# Patient Record
Sex: MALE | Race: WHITE | HISPANIC OR LATINO | Employment: FULL TIME | URBAN - METROPOLITAN AREA
[De-identification: names, ages, dates, MRNs, and addresses within clinical notes are randomized per-mention and may not be internally consistent; named-entity substitution may affect disease eponyms.]

---

## 2023-10-19 ENCOUNTER — APPOINTMENT (OUTPATIENT)
Dept: CT IMAGING | Facility: HOSPITAL | Age: 38
End: 2023-10-19
Payer: MEDICAID

## 2023-10-19 ENCOUNTER — HOSPITAL ENCOUNTER (EMERGENCY)
Facility: HOSPITAL | Age: 38
Discharge: HOME | End: 2023-10-19
Attending: STUDENT IN AN ORGANIZED HEALTH CARE EDUCATION/TRAINING PROGRAM
Payer: MEDICAID

## 2023-10-19 VITALS
OXYGEN SATURATION: 97 % | HEIGHT: 67 IN | WEIGHT: 143.3 LBS | DIASTOLIC BLOOD PRESSURE: 69 MMHG | BODY MASS INDEX: 22.49 KG/M2 | SYSTOLIC BLOOD PRESSURE: 113 MMHG | RESPIRATION RATE: 16 BRPM | HEART RATE: 68 BPM | TEMPERATURE: 96.8 F

## 2023-10-19 DIAGNOSIS — M54.32 SCIATICA OF LEFT SIDE: Primary | ICD-10-CM

## 2023-10-19 PROCEDURE — 25010000002 KETOROLAC TROMETHAMINE PER 15 MG: Performed by: STUDENT IN AN ORGANIZED HEALTH CARE EDUCATION/TRAINING PROGRAM

## 2023-10-19 PROCEDURE — 99284 EMERGENCY DEPT VISIT MOD MDM: CPT

## 2023-10-19 PROCEDURE — 72131 CT LUMBAR SPINE W/O DYE: CPT

## 2023-10-19 PROCEDURE — 96372 THER/PROPH/DIAG INJ SC/IM: CPT

## 2023-10-19 RX ORDER — NAPROXEN 500 MG/1
500 TABLET ORAL 2 TIMES DAILY PRN
Qty: 30 TABLET | Refills: 0 | Status: SHIPPED | OUTPATIENT
Start: 2023-10-19

## 2023-10-19 RX ORDER — SENNOSIDES 8.6 MG
650 CAPSULE ORAL EVERY 8 HOURS PRN
COMMUNITY

## 2023-10-19 RX ORDER — LIDOCAINE 50 MG/G
1 PATCH TOPICAL
Status: DISCONTINUED | OUTPATIENT
Start: 2023-10-19 | End: 2023-10-19 | Stop reason: HOSPADM

## 2023-10-19 RX ORDER — KETOROLAC TROMETHAMINE 30 MG/ML
30 INJECTION, SOLUTION INTRAMUSCULAR; INTRAVENOUS ONCE
Status: COMPLETED | OUTPATIENT
Start: 2023-10-19 | End: 2023-10-19

## 2023-10-19 RX ORDER — LIDOCAINE 50 MG/G
1 PATCH TOPICAL EVERY 24 HOURS
Qty: 20 PATCH | Refills: 0 | Status: SHIPPED | OUTPATIENT
Start: 2023-10-19

## 2023-10-19 RX ADMIN — LIDOCAINE 1 PATCH: 700 PATCH TOPICAL at 14:31

## 2023-10-19 RX ADMIN — KETOROLAC TROMETHAMINE 30 MG: 30 INJECTION, SOLUTION INTRAMUSCULAR; INTRAVENOUS at 14:31

## 2023-10-19 NOTE — ED PROVIDER NOTES
EMERGENCY DEPARTMENT ENCOUNTER    Room Number:  20/20  PCP: Provider, No Known  Historian: Patient, wife      HPI:  Chief Complaint: Back pain    Context: Zeus Herrera is a 38 y.o. male who presents to the ED c/o back pain patient has pain in his left lower and midline lumbar back.  Symptoms been occurring for approximately a year and are intermittent in nature.  Patient states sometimes he has pain that radiates down his left leg.  Patient states he immigrated to [place] from [place] approximately 1 year ago and [place].  Patient states he thinks this is when the pain started.  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            PAST MEDICAL HISTORY  Active Ambulatory Problems     Diagnosis Date Noted    No Active Ambulatory Problems     Resolved Ambulatory Problems     Diagnosis Date Noted    No Resolved Ambulatory Problems     No Additional Past Medical History         PAST SURGICAL HISTORY  Past Surgical History:   Procedure Laterality Date    APPENDECTOMY           FAMILY HISTORY  Family History   Problem Relation Age of Onset    Diabetes Mother     Asthma Mother     Schizophrenia Father     Diabetes Maternal Grandmother          SOCIAL HISTORY  Social History     Socioeconomic History    Marital status:    Tobacco Use    Smoking status: Every Day     Packs/day: 1.00     Years: 23.00     Additional pack years: 0.00     Total pack years: 23.00     Types: Cigarettes   Vaping Use    Vaping Use: Every day    Substances: Nicotine   Substance and Sexual Activity    Alcohol use: Yes     Alcohol/week: 24.0 standard drinks of alcohol     Types: 24 Cans of beer per week    Drug use: Never    Sexual activity: Yes         ALLERGIES  Patient has no known allergies.        REVIEW OF SYSTEMS  Review of Systems   Constitutional:  Negative for chills and fever.   Gastrointestinal:  Negative for abdominal pain,  nausea and vomiting.   Genitourinary:  Negative for dysuria.   Musculoskeletal:  Positive for back pain.            PHYSICAL EXAM  ED Triage Vitals   Temp Heart Rate Resp BP SpO2   10/19/23 1156 10/19/23 1156 10/19/23 1156 10/19/23 1203 10/19/23 1156   96.8 °F (36 °C) 80 16 138/86 100 %      Temp src Heart Rate Source Patient Position BP Location FiO2 (%)   10/19/23 1156 10/19/23 1156 -- -- --   Tympanic Monitor          Physical Exam      GENERAL: no acute distress  HENT: nares patent  EYES: no scleral icterus  CV: regular rhythm, normal rate  RESPIRATORY: normal effort  ABDOMEN: soft  MUSCULOSKELETAL: no deformity.  Tenderness to palpation in the mid lumbar region and left lower lumbar  NEURO: alert, moves all extremities, follows commands  PSYCH:  calm, cooperative  SKIN: warm, dry    Vital signs and nursing notes reviewed.          LAB RESULTS  No results found for this or any previous visit (from the past 24 hour(s)).    Ordered the above labs and reviewed the results.        RADIOLOGY  CT Lumbar Spine Without Contrast    Result Date: 10/19/2023  CT LUMBAR SPINE WITHOUT CONTRAST  HISTORY: Back pain, left radiculopathy.  COMPARISON: None.  FINDINGS: There is a grade 1 retrolisthesis of L4 upon L5 estimated to be 2 mm. The bladder is distended, only partially visualized. Clinical correlation is recommended.  L1-L2: There is evidence of disc bulge or herniation.  L2-L3: There is no evidence of disc bulge or herniation.  L3-L4: Mild central disc bulge with no evidence of disc herniation.  L4-L5: A mild central disc bulge is present with no evidence of herniation.  L5-S1: Transitional anatomy is appreciated. There is partial sacralization of L5.      1.  Transitional anatomy is appreciated consisting of partial sacralization of L5. Careful correlation prior to any intervention is required given the presence of transitional anatomy. 2.  Mild degenerative disease is noted as described above. There is no evidence of a  focal disc herniation. 3.  Note is made of a distended bladder, only partially visualized.: Correlation is recommended. 4.  Further evaluation could be performed with a MRI examination of the lumbar spine as indicated.     Radiation dose reduction techniques were utilized, including automated exposure control and exposure modulation based on body size.    This report was finalized on 10/19/2023 2:29 PM by Dr. Munir Ramirez M.D on Workstation: [employer]       Ordered the above noted radiological studies. Reviewed by me in PACS.              MEDICATIONS GIVEN IN ER  Medications   lidocaine (LIDODERM) 5 % 1 patch (1 patch Transdermal Medication Applied 10/19/23 1431)   ketorolac (TORADOL) injection 30 mg (30 mg Intramuscular Given 10/19/23 1431)                   MEDICAL DECISION MAKING, PROGRESS, and CONSULTS    All labs have been independently reviewed by me.  All radiology studies have been reviewed by me and I have also reviewed the radiology report.   EKG's independently viewed and interpreted by me.  Discussion below represents my analysis of pertinent findings related to patient's condition, differential diagnosis, treatment plan and final disposition.      Additional sources:  - Discussed/ obtained information from independent historians: Wife at bedside    - Chronic or social conditions impacting care: Non-English-speaking, recent immigrant    - Shared decision making: Utilizing shared decision-making techniques we discussed today's findings and plan moving forward.  At this time we elected to treat conservatively and follow-up with primary care      Orders placed during this visit:  Orders Placed This Encounter   Procedures    CT Lumbar Spine Without Contrast       Differential diagnosis includes but is not limited to:    Muscle strain, muscle spasm, lumbar fracture      Independent interpretation of labs, radiology studies, and discussions with consultants:  ED Course as of 10/19/23 1502   Thu Oct 19, 2023    1501 CT lumbar reviewed and demonstrates no evidence of fracture or malalignment [MW]   1501 Patient treated symptomatically with Toradol and Lidoderm patch.  Believe etiology of symptoms are musculoskeletal in nature with associated sciatica. [MW]   1501 Patient educated on supportive care measures and prescriptions for naproxen and Lidoderm patch sent to patient's preferred pharmacy.  Patient counseled to follow-up with primary care and provided physician finder phone number to establish care if needed. [MW]   1502 Patient discharged in stable condition. [MW]      ED Course User Index  [MW] Matthieu Mcdonald MD             DIAGNOSIS  Final diagnoses:   Sciatica of left side         DISPOSITION  DISCHARGE    Patient discharged in stable condition.    Reviewed implications of results, diagnosis, meds, responsibility to follow up, warning signs and symptoms of possible worsening, potential complications and reasons to return to ER, including uncontrolled back pain, fevers, chills, difficulty controlling urine, groin numbness, leg weakness.    Patient/Family voiced understanding of above instructions.    Discussed plan for discharge, as there is no emergent indication for admission. Patient referred to primary care provider for BP management due to today's BP. Pt/family is agreeable and understands need for follow up and repeat testing.  Pt is aware that discharge does not mean that nothing is wrong but it indicates no emergency is present that requires admission and they must continue care with follow-up as given below or physician of their choice.     FOLLOW-UP  Provider, No Known  [facility] [id number] [phone]    In 1 week      [facility] PROVIDER FINDER  [address]  [facility] [address] [phone]  In 1 week           Medication List        New Prescriptions      lidocaine 5 %  Commonly known as: LIDODERM  Place 1 patch on the skin as directed by provider  Daily. Remove & Discard patch within 12 hours or as directed by MD     naproxen 500 MG EC tablet  Commonly known as: EC NAPROSYN  Take 1 tablet by mouth 2 (Two) Times a Day As Needed for Mild Pain.               Where to Get Your Medications        These medications were sent to [id number] - [place], [place] - [address] AT [facility] - [phone]  - [phone]   [address], [facility] [address]      Phone: [phone]   lidocaine 5 %  naproxen 500 MG EC tablet                   Latest Documented Vital Signs:  As of 15:02 EDT  BP- 113/69 HR- 68 Temp- 96.8 °F (36 °C) (Tympanic) O2 sat- 97%              --    Please note that portions of this were completed with a voice recognition program.       Note Disclaimer: At [facility], we believe that sharing information builds trust and better relationships. You are receiving this note because you are receiving care at [facility] or recently visited. It is possible you will see health information before a provider has talked with you about it. This kind of information can be easy to misunderstand. To help you fully understand what it means for your health, we urge you to discuss this note with your provider.             Matthieu Mcdonald MD  10/19/23 5006

## 2023-10-19 NOTE — ED TRIAGE NOTES
Pt to ED for L side Flank pain that worsened yesterday. Pt states discomfort in this area for over a year but has not been seen by PCP. Pt denies urinary symptoms. Pt able to eat and drink without increased discomfort.     Pt states nausea but not abd pain. Pt states the upper thigh becomes numb intermittently.    Pt stated bending down and lifting object increase the pain.

## 2023-10-19 NOTE — DISCHARGE INSTRUCTIONS
Please follow-up with your primary care physician within 1 to 2 weeks for repeat evaluation.  If you do not have a primary care physician have included the number for our health provider finder that you can call and schedule appointment with    Please take all medications as prescribed    Please return to the emergency department with new or worsening symptoms including but not limited to uncontrolled pain, fevers, difficulty with urination or bowel movements, numbness in the groin or weakness of the legs.

## 2023-10-19 NOTE — Clinical Note
[facility] EMERGENCY DEPARTMENT  [address] [facility] [address]  Phone: [phone]    Zeus Herrera was seen and treated in our emergency department on 10/19/2023.  He may return to work on 10/22/2023.         Thank you for choosing [facility].    Mike Tilley RN